# Patient Record
(demographics unavailable — no encounter records)

---

## 2025-04-02 NOTE — HISTORY OF PRESENT ILLNESS
[FreeTextEntry1] : The patient is a 2-month-old male presenting for evaluation of two left branchial vestiges, which have been present since birth. 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 There is no associated redness, drainage, or ulceration. The patient has no reported issues with hearing or feeding. Family history is notable for the presence of ear tags, but there is no known family history of ear deformities or congenital abnormalities.  The patient was born at 39 weeks via planned C section. His birth weight was 7 lbs 9 oz,  and his current weight is 11 lbs 6 Oz with a length of 22 inches. There were no  complications during pregnancy. NICU stay x2 days for fluid in lungs and phototherapy. He passed his  hearing screening.  He has no known medical conditions and no history of surgeries. He is not on any medications and has no known allergies. No vaccines have been received at this time.  Developmentally, the patient is meeting all milestones for his age. He is turning from his side to his back and producing cooing sounds. There are no concerns regarding motor or social development. Socially, the patient lives with mother and two older siblings. There is no smoking exposure in the home.

## 2025-04-24 NOTE — PROCEDURE
[FreeTextEntry6] : Preopdx:  preauricular branchial vestige Procedure: excision and local tissue rearrangement, 1.1cm right preauricular Anesthesia: local 1% w/epi Specimens: to path on formalin No complications  Summary: IC obtained. Branchial vestige demarcated with marking pen.  Anterior based flap designed.  1%lido with epinephrine injected.  15 blade used to incise full thickness.    flap elevated in the subcutaneous plane.   Vestige fuly excised.  Hemostasis obtained with cautery. flap advanced and closed 1.1cm with 5-0 plain gut suture.  bacitracin placed.